# Patient Record
Sex: MALE | Race: WHITE | NOT HISPANIC OR LATINO | URBAN - METROPOLITAN AREA
[De-identification: names, ages, dates, MRNs, and addresses within clinical notes are randomized per-mention and may not be internally consistent; named-entity substitution may affect disease eponyms.]

---

## 2022-07-09 ENCOUNTER — TELEPHONE ENCOUNTER (OUTPATIENT)
Dept: URBAN - METROPOLITAN AREA CLINIC 121 | Facility: CLINIC | Age: 62
End: 2022-07-09

## 2022-07-10 ENCOUNTER — TELEPHONE ENCOUNTER (OUTPATIENT)
Dept: URBAN - METROPOLITAN AREA CLINIC 121 | Facility: CLINIC | Age: 62
End: 2022-07-10

## 2022-07-10 RX ORDER — ZOLPIDEM TARTRATE 10 MG
TABLET ORAL ONCE A DAY
Refills: 0 | Status: ACTIVE | COMMUNITY
Start: 2018-10-23

## 2022-07-10 RX ORDER — TRAMADOL HYDROCHLORIDE 50 MG/1
TABLET ORAL AS NEEDED
Refills: 0 | Status: ACTIVE | COMMUNITY
Start: 2018-10-23

## 2022-07-10 RX ORDER — COLCHICINE 0.6 MG/1
TABLET, FILM COATED ORAL
Refills: 0 | Status: ACTIVE | COMMUNITY
Start: 2018-10-01

## 2022-07-10 RX ORDER — ZOLPIDEM TARTRATE 10 MG/1
TABLET, FILM COATED ORAL
Refills: 5 | Status: ACTIVE | COMMUNITY
Start: 2018-10-01

## 2022-07-10 RX ORDER — GABAPENTIN 100 MG/1
CAPSULE ORAL
Refills: 0 | Status: ACTIVE | COMMUNITY
Start: 2018-10-23

## 2024-10-01 ENCOUNTER — P2P PATIENT RECORD (OUTPATIENT)
Age: 64
End: 2024-10-01

## 2024-10-01 ENCOUNTER — TELEPHONE ENCOUNTER (OUTPATIENT)
Dept: URBAN - METROPOLITAN AREA CLINIC 64 | Facility: CLINIC | Age: 64
End: 2024-10-01

## 2024-10-11 ENCOUNTER — OFFICE VISIT (OUTPATIENT)
Dept: URBAN - METROPOLITAN AREA CLINIC 57 | Facility: CLINIC | Age: 64
End: 2024-10-11

## 2024-10-15 ENCOUNTER — TELEPHONE ENCOUNTER (OUTPATIENT)
Dept: URBAN - METROPOLITAN AREA CLINIC 7 | Facility: CLINIC | Age: 64
End: 2024-10-15

## 2024-10-16 ENCOUNTER — DASHBOARD ENCOUNTERS (OUTPATIENT)
Age: 64
End: 2024-10-16

## 2024-10-16 ENCOUNTER — OFFICE VISIT (OUTPATIENT)
Dept: URBAN - METROPOLITAN AREA CLINIC 57 | Facility: CLINIC | Age: 64
End: 2024-10-16
Payer: MEDICARE

## 2024-10-16 ENCOUNTER — TELEPHONE ENCOUNTER (OUTPATIENT)
Dept: URBAN - METROPOLITAN AREA CLINIC 63 | Facility: CLINIC | Age: 64
End: 2024-10-16

## 2024-10-16 VITALS
BODY MASS INDEX: 36.37 KG/M2 | SYSTOLIC BLOOD PRESSURE: 156 MMHG | WEIGHT: 274.4 LBS | HEIGHT: 73 IN | OXYGEN SATURATION: 97 % | HEART RATE: 96 BPM | DIASTOLIC BLOOD PRESSURE: 90 MMHG

## 2024-10-16 DIAGNOSIS — I10 ESSENTIAL HYPERTENSION: ICD-10-CM

## 2024-10-16 DIAGNOSIS — R19.7 ACUTE DIARRHEA: ICD-10-CM

## 2024-10-16 DIAGNOSIS — R74.8 ELEVATED ALKALINE PHOSPHATASE LEVEL: ICD-10-CM

## 2024-10-16 DIAGNOSIS — Z86.0100 PERSONAL HISTORY OF COLONIC POLYPS: ICD-10-CM

## 2024-10-16 PROBLEM — 59621000: Status: ACTIVE | Noted: 2024-10-16

## 2024-10-16 PROBLEM — 87522002: Status: ACTIVE | Noted: 2024-10-16

## 2024-10-16 PROCEDURE — 99214 OFFICE O/P EST MOD 30 MIN: CPT

## 2024-10-16 RX ORDER — ZOLPIDEM TARTRATE 10 MG/1
TAKE 1 TABLET BY MOUTH EVERY DAY AT BEDTIME TABLET, FILM COATED ORAL
Qty: 90 EACH | Refills: 0 | Status: ACTIVE | COMMUNITY

## 2024-10-16 RX ORDER — CHOLESTYRAMINE 4 G/9G
1 SCOOP POWDER, FOR SUSPENSION ORAL ONCE A DAY
Qty: 30 | Refills: 3 | OUTPATIENT
Start: 2024-10-16

## 2024-10-16 NOTE — PHYSICAL EXAM CONSTITUTIONAL:
How Severe Is It?: moderate
well developed, well nourished , in no acute distress , ambulating without difficulty , normal communication ability
Is This A New Presentation, Or A Follow-Up?: Follow Up Accutane

## 2024-10-16 NOTE — HPI-PREVIOUS LABS
1/30/2023 CBC significant for no significant abnormalities, CMP significant for glucose 166, alkaline phosphatase 144, otherwise within normal limits

## 2024-10-16 NOTE — HPI-PREVIOUS PROCEDURES
Colonoscopy 12/10/2018 consistent with diverticulosis in sigmoid colon Internal hemorrhoids No specimens collected

## 2024-10-16 NOTE — HPI-ZZZTODAY'S VISIT
Patient is a very pleasant 64-year-old male who presents for diarrhea.  He is a previous patient of Dr. Brar.  Last seen in 2018.  He will be establishing with Dr. Pozo today.  Past medical history significant for colon polyps, diabetes, essential hypertension, pulmonary embolism, idiopathic peripheral neuropathy, iron deficiency anemia, hepatic steatosis.  Past surgical history significant for Desean-en-Y gastric bypass, hernia repair, cholecystectomy, multiple ventral hernia repairs, AVR 2019.  Last colonoscopy 12/10/2018.  Family history noncontributory.  Patient presents for chief complaint of diarrhea.  He relates that in 2019 through 2020 he was in the hospital for over a year for multiple organ failure.  During that time he had significant abdominal surgery but does not know the details of this.  He is unsure if this has any implication on his complaint of diarrhea.  He has had upwards of 6 watery loose bowel movements a day for over a year.  He has had blood work and stool studies with his PCP recently but he does not have record of this with him today.  We will request these records for further evaluation.  He admits to GERD occasionally with trigger foods but otherwise feels in control of his acid reflux.  He is concerned because he had a high blood pressure when he came into the office.  Upon retesting he no longer had an elevation in his blood pressure. he denies dysphagia, dyspepsia, pyrosis, abdominal pain, unintentional weight loss, melena, hematochezia.

## 2024-10-23 ENCOUNTER — LAB OUTSIDE AN ENCOUNTER (OUTPATIENT)
Dept: URBAN - METROPOLITAN AREA CLINIC 57 | Facility: CLINIC | Age: 64
End: 2024-10-23

## 2025-01-13 ENCOUNTER — CLAIMS CREATED FROM THE CLAIM WINDOW (OUTPATIENT)
Dept: URBAN - METROPOLITAN AREA SURGERY CENTER 4 | Facility: SURGERY CENTER | Age: 65
End: 2025-01-13
Payer: MEDICARE

## 2025-01-13 ENCOUNTER — CLAIMS CREATED FROM THE CLAIM WINDOW (OUTPATIENT)
Dept: URBAN - METROPOLITAN AREA SURGERY CENTER 4 | Facility: SURGERY CENTER | Age: 65
End: 2025-01-13

## 2025-01-13 DIAGNOSIS — K64.0 FIRST DEGREE HEMORRHOIDS: ICD-10-CM

## 2025-01-13 DIAGNOSIS — Z86.0100 PERSONAL HISTORY OF COLON POLYPS, UNSPECIFIED: ICD-10-CM

## 2025-01-13 DIAGNOSIS — Z09 ENCOUNTER FOR FOLLOW-UP EXAMINATION AFTER COMPLETED TREATMENT FOR CONDITIONS OTHER THAN MALIGNANT NEOPLASM: ICD-10-CM

## 2025-01-13 DIAGNOSIS — K63.5 POLYP OF ASCENDING COLON, UNSPECIFIED TYPE: ICD-10-CM

## 2025-01-13 DIAGNOSIS — K57.30 DIVERTICULOSIS OF LARGE INTESTINE WITHOUT PERFORATION OR ABSCESS WITHOUT BLEEDING: ICD-10-CM

## 2025-01-13 DIAGNOSIS — D12.0 BENIGN NEOPLASM OF CECUM: ICD-10-CM

## 2025-01-13 DIAGNOSIS — D12.4 ADENOMA OF DESCENDING COLON: ICD-10-CM

## 2025-01-13 PROCEDURE — 45380 COLONOSCOPY AND BIOPSY: CPT | Performed by: INTERNAL MEDICINE

## 2025-01-13 PROCEDURE — 00811 ANES LWR INTST NDSC NOS: CPT | Performed by: NURSE ANESTHETIST, CERTIFIED REGISTERED

## 2025-01-13 PROCEDURE — 45385 COLONOSCOPY W/LESION REMOVAL: CPT | Performed by: INTERNAL MEDICINE

## 2025-01-13 RX ORDER — CHOLESTYRAMINE 4 G/9G
1 SCOOP POWDER, FOR SUSPENSION ORAL ONCE A DAY
Qty: 30 | Refills: 3 | Status: ACTIVE | COMMUNITY
Start: 2024-10-16

## 2025-01-13 RX ORDER — ZOLPIDEM TARTRATE 10 MG/1
TAKE 1 TABLET BY MOUTH EVERY DAY AT BEDTIME TABLET, FILM COATED ORAL
Qty: 90 EACH | Refills: 0 | Status: ACTIVE | COMMUNITY

## 2025-01-31 ENCOUNTER — OFFICE VISIT (OUTPATIENT)
Dept: URBAN - METROPOLITAN AREA CLINIC 57 | Facility: CLINIC | Age: 65
End: 2025-01-31
Payer: MEDICARE

## 2025-01-31 VITALS
HEART RATE: 84 BPM | HEIGHT: 73 IN | SYSTOLIC BLOOD PRESSURE: 120 MMHG | WEIGHT: 266 LBS | BODY MASS INDEX: 35.25 KG/M2 | DIASTOLIC BLOOD PRESSURE: 70 MMHG | OXYGEN SATURATION: 98 %

## 2025-01-31 DIAGNOSIS — R19.7 INTERMITTENT DIARRHEA: ICD-10-CM

## 2025-01-31 DIAGNOSIS — D36.9 ADENOMATOUS POLYPS: ICD-10-CM

## 2025-01-31 DIAGNOSIS — R12 PYROSIS: ICD-10-CM

## 2025-01-31 PROCEDURE — 99214 OFFICE O/P EST MOD 30 MIN: CPT

## 2025-01-31 RX ORDER — CHOLESTYRAMINE 4 G/9G
1 SCOOP POWDER, FOR SUSPENSION ORAL ONCE A DAY
Qty: 30 | Refills: 3 | Status: ON HOLD | COMMUNITY
Start: 2024-10-16

## 2025-01-31 RX ORDER — ZOLPIDEM TARTRATE 10 MG/1
TAKE 1 TABLET BY MOUTH EVERY DAY AT BEDTIME TABLET, FILM COATED ORAL
Qty: 90 EACH | Refills: 0 | Status: ACTIVE | COMMUNITY

## 2025-01-31 NOTE — HPI-PREVIOUS LABS
10/29/2024 fecal calprotectin mildly elevated at 43.65, pancreatic elastase mildly low at 96.45, culture and sensitivity within normal limits negative   1/30/2023 CBC significant for no significant abnormalities, CMP significant for glucose 166, alkaline phosphatase 144, otherwise within normal limits

## 2025-01-31 NOTE — HPI-PREVIOUS PROCEDURES
1/13/2025 colonoscopy consistent with internal hemorrhoids Diverticulosis in entire examined colon 10 mm polyp in cecum pathology consistent with tubular adenoma Diminutive 1 to 3 mm polyp in descending colon pathology consistent with hyperplastic polyp Otherwise normal Random colon biopsy consistent with no significant abnormality negative for microscopic colitis    Colonoscopy 12/10/2018 consistent with diverticulosis in sigmoid colon Internal hemorrhoids No specimens collected Outreach attempt was made to schedule a Medicare Wellness Visit. This was the first attempt. Contact was not made, left message.

## 2025-01-31 NOTE — HPI-PREVIOUS IMAGING
1/30/2023 CT abdomen pelvis with contrast consistent with mild left hydronephrosis with 4 mm stones at left distal ureter and left ureter vesicle junction Additional nonobstructing stone in the left kidney Colonic diverticulosis with no evidence of diverticulitis

## 2025-03-25 ENCOUNTER — TELEPHONE ENCOUNTER (OUTPATIENT)
Dept: URBAN - METROPOLITAN AREA CLINIC 64 | Facility: CLINIC | Age: 65
End: 2025-03-25

## 2025-03-25 ENCOUNTER — P2P PATIENT RECORD (OUTPATIENT)
Age: 65
End: 2025-03-25

## 2025-03-28 ENCOUNTER — OFFICE VISIT (OUTPATIENT)
Dept: URBAN - METROPOLITAN AREA CLINIC 57 | Facility: CLINIC | Age: 65
End: 2025-03-28

## 2025-03-28 RX ORDER — CHOLESTYRAMINE 4 G/9G
1 SCOOP POWDER, FOR SUSPENSION ORAL ONCE A DAY
Qty: 30 | Refills: 3 | COMMUNITY
Start: 2024-10-16

## 2025-03-28 RX ORDER — ZOLPIDEM TARTRATE 10 MG/1
TAKE 1 TABLET BY MOUTH EVERY DAY AT BEDTIME TABLET, FILM COATED ORAL
Qty: 90 EACH | Refills: 0 | COMMUNITY

## 2025-03-28 RX ORDER — PANCRELIPASE LIPASE, PANCRELIPASE PROTEASE, PANCRELIPASE AMYLASE 252600; 60000; 189600 [USP'U]/1; [USP'U]/1; [USP'U]/1
AS DIRECTED CAPSULE, DELAYED RELEASE ORAL
Qty: 300 | Refills: 3 | OUTPATIENT
Start: 2025-03-28

## 2025-03-28 NOTE — HPI-TODAY'S VISIT:
Patient is a very pleasant 64-year-old male who presents for hospital follow-up.  He is a patient of Dr. Pozo.  I last saw him on 1/31/2025.  Past medical history significant for colon polyps, diabetes, hypertension, pulmonary embolism, idiopathic peripheral neuropathy, iron deficiency anemia, hepatic steatosis recurrent.  A surgical history significant for Desean-en-Y gastric bypass, hiatal hernia, cystectomy, multiple ventral hernia repairs, AVR 2019.  Last colonoscopy 1/13/2025.  Family history noncontributory.  Previously patient presented for diarrhea.  In 2019 through 2020 he was admitted to the hospital for multiple organ failure.  At that time he had abdominal surgery.  1 year following this unknown abdominal surgery he developed diarrhea for which she was curious if the abdominal surgery had any correlation.  He has previously had blood work and stool studies within normal limits.  He had a colonoscopy for this reason with random colon biopsies negative for microscopic colitis and IBD.  Diarrhea resolved on its own.  Patient had large adenomatous colon polyp for which repeat colonoscopy was recommended for 3 years  Patient presents today for hospital follow-up.  He was admitted to [facility] 3/18/2025 through 3/19/2025 for recurrent acute pancreatitis with uncomplicated features demonstrated on CT.  Patient was given 1 day fluid recess with improvement of pain and recommended to follow-up outpatient.  Recommend considered outpatient EUS Previous imaging: CT abdomen pelvis with contrast 3/18/2025 consistent with moderate acute pancreatitis worsened several studies several months prior without evidence of pseudocyst or necrosis.  Severe sigmoid diverticulosis without evidence of acute diverticulitis.  Nonobstructing 9 mm stone in the left kidney

## 2025-03-31 ENCOUNTER — ERX REFILL RESPONSE (OUTPATIENT)
Dept: URBAN - METROPOLITAN AREA CLINIC 57 | Facility: CLINIC | Age: 65
End: 2025-03-31

## 2025-03-31 RX ORDER — PANCRELIPASE LIPASE, PANCRELIPASE PROTEASE, PANCRELIPASE AMYLASE 252600; 60000; 189600 [USP'U]/1; [USP'U]/1; [USP'U]/1
AS DIRECTED CAPSULE, DELAYED RELEASE ORAL
Qty: 300 | Refills: 3 | OUTPATIENT

## 2025-03-31 RX ORDER — PANCRELIPASE LIPASE, PANCRELIPASE PROTEASE, PANCRELIPASE AMYLASE 252600; 60000; 189600 [USP'U]/1; [USP'U]/1; [USP'U]/1
2 CAPSULES CAPSULE, DELAYED RELEASE ORAL
Qty: 720 CAPSULE | Refills: 3 | OUTPATIENT

## 2025-04-03 ENCOUNTER — TELEPHONE ENCOUNTER (OUTPATIENT)
Dept: URBAN - METROPOLITAN AREA CLINIC 57 | Facility: CLINIC | Age: 65
End: 2025-04-03

## 2025-04-03 RX ORDER — PANCRELIPASE LIPASE, PANCRELIPASE PROTEASE, PANCRELIPASE AMYLASE 252600; 60000; 189600 [USP'U]/1; [USP'U]/1; [USP'U]/1
2 CAPSULES CAPSULE, DELAYED RELEASE ORAL
Qty: 720 CAPSULE | Refills: 3

## 2025-04-11 ENCOUNTER — TELEPHONE ENCOUNTER (OUTPATIENT)
Dept: URBAN - METROPOLITAN AREA CLINIC 66 | Facility: CLINIC | Age: 65
End: 2025-04-11

## 2025-05-08 ENCOUNTER — TELEPHONE ENCOUNTER (OUTPATIENT)
Dept: URBAN - METROPOLITAN AREA CLINIC 63 | Facility: CLINIC | Age: 65
End: 2025-05-08

## 2025-05-09 ENCOUNTER — OFFICE VISIT (OUTPATIENT)
Dept: URBAN - METROPOLITAN AREA CLINIC 57 | Facility: CLINIC | Age: 65
End: 2025-05-09

## 2025-05-09 RX ORDER — PANCRELIPASE LIPASE, PANCRELIPASE PROTEASE, PANCRELIPASE AMYLASE 252600; 60000; 189600 [USP'U]/1; [USP'U]/1; [USP'U]/1
2 CAPSULES CAPSULE, DELAYED RELEASE ORAL
Qty: 720 CAPSULE | Refills: 3 | COMMUNITY

## 2025-05-09 RX ORDER — CHOLESTYRAMINE 4 G/9G
1 SCOOP POWDER, FOR SUSPENSION ORAL ONCE A DAY
Qty: 30 | Refills: 3 | COMMUNITY
Start: 2024-10-16

## 2025-05-09 RX ORDER — ZOLPIDEM TARTRATE 10 MG/1
TAKE 1 TABLET BY MOUTH EVERY DAY AT BEDTIME TABLET, FILM COATED ORAL
Qty: 90 EACH | Refills: 0 | COMMUNITY

## 2025-05-09 NOTE — HPI-TODAY'S VISIT:
Patient is a very pleasant 64-year-old male who presents for 2-month follow-up.  He is a patient of Dr. Pozo.  I last saw him on 3/28/2025.  Past medical history significant for colon polyps, diabetes, hypertension, PE, idiopathic peripheral neuropathy, iron deficiency anemia, hepatic steatosis.  Past surgical history significant for Desean-en-Y gastric bypass, hiatal hernia,  multiple ventral hernia repair, AVR 2019 last colonoscopy 1/13/2025.  Patient has been followed for diarrhea. In 20 19 through 20 20 admitted to hospital for multiple organ failure.  At that time he had abdominal surgery.  1 year following abdominal surgery he developed diarrhea.  He had blood work and stool studies negative for microscopic colitis and IBD.  Spontaneous resolution of diarrhea.  Due to large adenomatous colon polyps repeat colonoscopy recommended for 3 years. Patient was admitted to [facility] 3/18/2025 through 3/19/2025 for recurrent acute pancreatitis with unkempt created features demonstrated on CT.  Patient was given 1 day of fluid reassess and improvement of pain.  Recommended follow-up outpatient and consideration of EUS.  Patient was seen in outpatient setting at which time repeat labs and rule out of autoimmune pancreatitis was done.  Recommendation for EUS with Dr. Sanchez.  he had challenges scheduling and was therefore referred to Dr. Hull

## 2025-06-06 ENCOUNTER — OFFICE VISIT (OUTPATIENT)
Dept: URBAN - METROPOLITAN AREA CLINIC 57 | Facility: CLINIC | Age: 65
End: 2025-06-06
Payer: MEDICARE

## 2025-06-06 DIAGNOSIS — D36.9 ADENOMATOUS POLYPS: ICD-10-CM

## 2025-06-06 DIAGNOSIS — Z87.19 HISTORY OF PANCREATITIS: ICD-10-CM

## 2025-06-06 DIAGNOSIS — R12 PYROSIS: ICD-10-CM

## 2025-06-06 DIAGNOSIS — K86.81 EXOCRINE PANCREATIC INSUFFICIENCY: ICD-10-CM

## 2025-06-06 PROBLEM — 47367009: Status: ACTIVE | Noted: 2025-06-06

## 2025-06-06 PROCEDURE — 99214 OFFICE O/P EST MOD 30 MIN: CPT

## 2025-06-06 RX ORDER — PANCRELIPASE LIPASE, PANCRELIPASE PROTEASE, PANCRELIPASE AMYLASE 252600; 60000; 189600 [USP'U]/1; [USP'U]/1; [USP'U]/1
2 CAPSULES CAPSULE, DELAYED RELEASE ORAL
Qty: 720 CAPSULE | Refills: 3 | Status: ON HOLD | COMMUNITY

## 2025-06-06 RX ORDER — CHOLESTYRAMINE 4 G/9G
1 SCOOP POWDER, FOR SUSPENSION ORAL ONCE A DAY
Qty: 30 | Refills: 3 | Status: ON HOLD | COMMUNITY
Start: 2024-10-16

## 2025-06-06 RX ORDER — ZOLPIDEM TARTRATE 10 MG/1
TAKE 1 TABLET BY MOUTH EVERY DAY AT BEDTIME TABLET, FILM COATED ORAL
Qty: 90 EACH | Refills: 0 | Status: ACTIVE | COMMUNITY

## 2025-06-06 RX ORDER — PANCRELIPASE LIPASE, PANCRELIPASE PROTEASE, PANCRELIPASE AMYLASE 252600; 60000; 189600 [USP'U]/1; [USP'U]/1; [USP'U]/1
TAKE 2 WITH EACH MEAL FOR 3 MEALS AND 1 WITH EACH SNACK FOR 4 SNACKS ORALLY 7 TIMES DAILY CAPSULE, DELAYED RELEASE ORAL
Qty: 900 | Refills: 3 | OUTPATIENT
Start: 2025-06-06

## 2025-06-06 NOTE — HPI-TODAY'S VISIT:
Patient is a very pleasant 64-year-old male who presents for 2-month follow-up.  He is a patient of Dr. Pozo.  I last saw him on 3/28/2025.  Past medical history significant for colon polyps, diabetes, hypertension, PE, idiopathic peripheral neuropathy, iron deficiency anemia, hepatic steatosis.  Past surgical history significant for Desean-en-Y gastric bypass, hiatal hernia,  multiple ventral hernia repair, AVR 2019. Last colonoscopy 1/13/2025.  Patient has been followed for diarrhea. In 2019 through 2020 admitted to hospital for multiple organ failure.  At that time he had abdominal surgery.  1 year following abdominal surgery he developed diarrhea.  He had blood work and stool studies negative for microscopic colitis and IBD.  Spontaneous resolution of diarrhea.  Due to large adenomatous colon polyps repeat colonoscopy recommended for 3 years.  Patient was admitted to [facility] 3/18/2025 through 3/19/2025 for recurrent acute pancreatitis Patient was given 1 day of fluid reassess and improvement of pain.  Recommended follow-up outpatient and consideration of EUS.  Patient was seen in outpatient setting at which time repeat labs and rule out of autoimmune pancreatitis was done.  Recommendation for EUS with Dr. Sanchez.  he had challenges scheduling and was therefore referred to Dr. Hull  Patient presents for follow-up.  He is scheduled for EUS with Dr. Hull's office on 7/15/2025.  Records have been requested and are being faxed over for my review.  Patient has run out of Zenpep samples and this seemed to work very well for him.  He did not get the prescription that was filled previously for his Zenpep.  He does have stool studies today in hand that he was curious where he should drop off.  He thought that they were ordered by our office, however on further evaluation it was determined that Dr. Hull's office had ordered them.  He is otherwise doing well from a GI perspective and is anxious to get his EUS performed.  He denies dysphagia, dyspepsia, pyrosis, abdominal pain, melena, hematochezia.

## 2025-06-06 NOTE — HPI-PREVIOUS PROCEDURES
1/13/2025 colonoscopy consistent with internal hemorrhoids Diverticulosis in entire examined colon 10 mm polyp in cecum pathology consistent with tubular adenoma Diminutive 1 to 3 mm polyp in descending colon pathology consistent with hyperplastic polyp Otherwise normal Random colon biopsy consistent with no significant abnormality negative for microscopic colitis    Colonoscopy 12/10/2018 consistent with diverticulosis in sigmoid colon Internal hemorrhoids No specimens collected

## 2025-06-06 NOTE — HPI-PREVIOUS IMAGING
CT abdomen pelvis with contrast 3/18/2025 consistent with moderate acute pancreatitis worsened several studies several months prior without evidence of pseudocyst or necrosis. Severe sigmoid diverticulosis without evidence of acute diverticulitis. Nonobstructing 9 mm stone in the left kidney  1/30/2023 CT abdomen pelvis with contrast consistent with mild left hydronephrosis with 4 mm stones at left distal ureter and left ureter vesicle junction Additional nonobstructing stone in the left kidney Colonic diverticulosis with no evidence of diverticulitis

## 2025-06-23 ENCOUNTER — TELEPHONE ENCOUNTER (OUTPATIENT)
Dept: URBAN - METROPOLITAN AREA CLINIC 57 | Facility: CLINIC | Age: 65
End: 2025-06-23

## 2025-08-01 ENCOUNTER — OFFICE VISIT (OUTPATIENT)
Dept: URBAN - METROPOLITAN AREA CLINIC 57 | Facility: CLINIC | Age: 65
End: 2025-08-01